# Patient Record
Sex: FEMALE | Race: WHITE
[De-identification: names, ages, dates, MRNs, and addresses within clinical notes are randomized per-mention and may not be internally consistent; named-entity substitution may affect disease eponyms.]

---

## 2020-01-04 ENCOUNTER — HOSPITAL ENCOUNTER (EMERGENCY)
Dept: HOSPITAL 4 - SED | Age: 25
Discharge: HOME | End: 2020-01-04
Payer: COMMERCIAL

## 2020-01-04 VITALS — SYSTOLIC BLOOD PRESSURE: 138 MMHG

## 2020-01-04 VITALS — HEIGHT: 64 IN | WEIGHT: 120 LBS | BODY MASS INDEX: 20.49 KG/M2
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
## 2020-01-04 DIAGNOSIS — S16.1XXA: Primary | ICD-10-CM
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
## 2020-01-04 DIAGNOSIS — J45.909: ICD-10-CM
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
